# Patient Record
Sex: MALE | HISPANIC OR LATINO | URBAN - METROPOLITAN AREA
[De-identification: names, ages, dates, MRNs, and addresses within clinical notes are randomized per-mention and may not be internally consistent; named-entity substitution may affect disease eponyms.]

---

## 2023-12-19 ENCOUNTER — TRANSFERRED RECORDS (OUTPATIENT)
Dept: HEALTH INFORMATION MANAGEMENT | Facility: CLINIC | Age: 58
End: 2023-12-19

## 2023-12-19 LAB — RETINOPATHY: NEGATIVE

## 2023-12-20 ENCOUNTER — TELEPHONE (OUTPATIENT)
Dept: OPHTHALMOLOGY | Facility: CLINIC | Age: 58
End: 2023-12-20

## 2023-12-20 NOTE — TELEPHONE ENCOUNTER
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 Patel on 12/28 @ 9am. Patient confirmed appt date, time, and location. Itinerary sent in mail as well. Pt stated this is all dependent on getting a ride set up. Gave direct number for any issues.   "

## 2023-12-21 NOTE — TELEPHONE ENCOUNTER
FUTURE VISIT INFORMATION      FUTURE VISIT INFORMATION:  Date: 12/28/23  Time: 9:00am  Location: [facility]  REFERRAL INFORMATION:  Referring provider:  Dr. Willis Zamora  Referring providers clinic:  [facility]   Reason for visit/diagnosis  blind painful eye, enucleation eval     RECORDS REQUESTED FROM:       Clinic name Comments Records Status Imaging Status   [facility]  Recs scanned into chart under 12/19/23 EPIC

## 2023-12-27 ENCOUNTER — TRANSCRIBE ORDERS (OUTPATIENT)
Dept: OTHER | Age: 58
End: 2023-12-27

## 2023-12-27 ENCOUNTER — TELEPHONE (OUTPATIENT)
Dept: OPHTHALMOLOGY | Facility: CLINIC | Age: 58
End: 2023-12-27

## 2023-12-27 DIAGNOSIS — E11.9 DIABETES MELLITUS, TYPE 2 (H): ICD-10-CM

## 2023-12-27 DIAGNOSIS — S05.92XA: ICD-10-CM

## 2023-12-27 DIAGNOSIS — H57.12 OCULAR PAIN, LEFT: Primary | ICD-10-CM

## 2023-12-28 ENCOUNTER — PRE VISIT (OUTPATIENT)
Dept: OPHTHALMOLOGY | Facility: CLINIC | Age: 58
End: 2023-12-28

## 2024-01-03 ENCOUNTER — TELEPHONE (OUTPATIENT)
Dept: OPHTHALMOLOGY | Facility: CLINIC | Age: 59
End: 2024-01-03